# Patient Record
Sex: MALE | Race: WHITE | NOT HISPANIC OR LATINO | Employment: OTHER | URBAN - METROPOLITAN AREA
[De-identification: names, ages, dates, MRNs, and addresses within clinical notes are randomized per-mention and may not be internally consistent; named-entity substitution may affect disease eponyms.]

---

## 2018-01-12 NOTE — PATIENT DISCUSSION
(C01.404) Vitreous degeneration, bilateral - Assesment : Examination revealed PVD OU. - Plan : Monitor for changes. Advised patient to call our office with decreased vision or an increase in flashes and/or floaters.

## 2018-01-12 NOTE — PATIENT DISCUSSION
(H02.102) Unspecified ectropion of right lower eyelid - Assesment : Examination revealed ectropion of the eyelid. Contributing to tears overflowing. - Plan : Recommend warm compresses at bedtime and Tobradex ointment QHS along BLL for 1 month. If no improvement may consider evaluation w/ JRB. Monitor for changes. RV 2 months follow up.

## 2018-01-12 NOTE — PATIENT DISCUSSION
(H02.105) Unspecified ectropion of left lower eyelid - Assesment : Examination revealed ectropion of the eyelid. - Plan : see plan #1.

## 2019-01-23 ENCOUNTER — NEW PATIENT COMPREHENSIVE (OUTPATIENT)
Dept: URBAN - METROPOLITAN AREA CLINIC 35 | Facility: CLINIC | Age: 59
End: 2019-01-23

## 2019-01-23 DIAGNOSIS — J30.1: ICD-10-CM

## 2019-01-23 DIAGNOSIS — H04.123: ICD-10-CM

## 2019-01-23 PROCEDURE — 92015 DETERMINE REFRACTIVE STATE: CPT

## 2019-01-23 PROCEDURE — 92004 COMPRE OPH EXAM NEW PT 1/>: CPT

## 2019-01-23 ASSESSMENT — TONOMETRY
OS_IOP_MMHG: 12
OD_IOP_MMHG: 10

## 2019-01-23 ASSESSMENT — VISUAL ACUITY
OS_SC: 20/40
OD_SC: 20/40
OD_CC: J2
OS_CC: 20/25
OD_CC: 20/25-1
OS_CC: J2
